# Patient Record
Sex: FEMALE | URBAN - METROPOLITAN AREA
[De-identification: names, ages, dates, MRNs, and addresses within clinical notes are randomized per-mention and may not be internally consistent; named-entity substitution may affect disease eponyms.]

---

## 2023-12-28 LAB — CRC RECOMMENDATION EXT: NORMAL

## 2024-10-07 ENCOUNTER — TELEPHONE (OUTPATIENT)
Dept: OPHTHALMOLOGY | Facility: CLINIC | Age: 64
End: 2024-10-07

## 2024-10-07 NOTE — TELEPHONE ENCOUNTER
"----- Message from Julienne Lynch sent at 10/7/2024 11:04 AM CDT -----  Regarding: Referral    ----- Message -----  From: Eduar Mcmullen  Sent: 10/7/2024  10:13 AM CDT  To: Lorenza Richard MA; MATTHIAS Garcia    Consult/Advisory    Name Of Caller:  Amadou Lo [[facility]]    Contact Preference?:  [phone]    What is the nature of the call?: Inquiring about status of new pt (Neuro Ophth) referral that was faxed over to [phone] on 9/27    Additional Notes:  "Thank you for all that you do for our patients"  "

## 2024-10-11 ENCOUNTER — TELEPHONE (OUTPATIENT)
Dept: OPHTHALMOLOGY | Facility: CLINIC | Age: 64
End: 2024-10-11

## 2024-10-11 NOTE — TELEPHONE ENCOUNTER
Spoke with pt stating that  reviewed chart and wants her to f/u for next available which will be in May 2025.

## 2025-02-26 ENCOUNTER — PATIENT OUTREACH (OUTPATIENT)
Dept: ADMINISTRATIVE | Facility: HOSPITAL | Age: 65
End: 2025-02-26

## 2025-02-26 NOTE — PROGRESS NOTES
Population Health Chart Review & Patient Outreach Details      Additional [facility] Health Notes:    CMS/MSSP ACO Non-compliant report chart audits for COLON CANCER SCREENING. Chart review completed for HM test overdue (mammograms, Colonoscopies, pap smears, DM labs, and/or EYE EXAMs)      Care Everywhere and media, updates requested and reviewed.               Updates Requested / Reviewed:      Care Everywhere, , and External Sources: [employer]         Health Maintenance Topics Overdue:      VB Score: 2     Cervical Cancer Screening  Mammogram    Influenza Vaccine  Pneumonia Vaccine  Tetanus Vaccine  Shingles/Zoster Vaccine                  Health Maintenance Topic(s) Outreach Outcomes & Actions Taken:    Colorectal Cancer Screening - Outreach Outcomes & Actions Taken  : External Records Uploaded, Care Team Updated, & History Updated if Applicable

## 2025-06-23 ENCOUNTER — TELEPHONE (OUTPATIENT)
Dept: OPHTHALMOLOGY | Facility: CLINIC | Age: 65
End: 2025-06-23